# Patient Record
Sex: MALE | Race: WHITE | Employment: FULL TIME | URBAN - METROPOLITAN AREA
[De-identification: names, ages, dates, MRNs, and addresses within clinical notes are randomized per-mention and may not be internally consistent; named-entity substitution may affect disease eponyms.]

---

## 2022-06-18 ENCOUNTER — OFFICE VISIT (OUTPATIENT)
Dept: URGENT CARE | Facility: URGENT CARE | Age: 62
End: 2022-06-18
Payer: COMMERCIAL

## 2022-06-18 VITALS
SYSTOLIC BLOOD PRESSURE: 160 MMHG | OXYGEN SATURATION: 97 % | DIASTOLIC BLOOD PRESSURE: 80 MMHG | HEART RATE: 65 BPM | TEMPERATURE: 98.3 F | RESPIRATION RATE: 16 BRPM

## 2022-06-18 DIAGNOSIS — M54.2 NECK PAIN: ICD-10-CM

## 2022-06-18 DIAGNOSIS — S46.811A STRAIN OF TRAPEZIUS MUSCLE, RIGHT, INITIAL ENCOUNTER: ICD-10-CM

## 2022-06-18 DIAGNOSIS — R03.0 ELEVATED BLOOD PRESSURE READING WITHOUT DIAGNOSIS OF HYPERTENSION: Primary | ICD-10-CM

## 2022-06-18 PROCEDURE — 99204 OFFICE O/P NEW MOD 45 MIN: CPT | Performed by: FAMILY MEDICINE

## 2022-06-18 RX ORDER — CYCLOBENZAPRINE HCL 10 MG
10 TABLET ORAL 3 TIMES DAILY PRN
Qty: 45 TABLET | Refills: 0 | Status: SHIPPED | OUTPATIENT
Start: 2022-06-18

## 2022-06-18 RX ORDER — ALBUTEROL SULFATE 90 UG/1
2 AEROSOL, METERED RESPIRATORY (INHALATION) EVERY 6 HOURS
COMMUNITY

## 2022-06-18 RX ORDER — GABAPENTIN 100 MG/1
100 CAPSULE ORAL 3 TIMES DAILY
Qty: 30 CAPSULE | Refills: 0 | Status: SHIPPED | OUTPATIENT
Start: 2022-06-18

## 2022-06-18 NOTE — PROGRESS NOTES
Chief complaint: back pain    Right upper back  6 days ago patient lifted a lawnmower to the truck - it was an awkward position   Didn't have any immediate pain - maybe a bit sore that evening   That night reached for his phone and may have stretched   Started about 5 days ago the next day was when he started really having pain   Pain in the right upper back   Shooting down the right arm     BP elevated today but asymptomatic. No headache no blurring of vision no chest pain no shortness of breath no dizziness no unsteadiness no numbness no weakness  Tried over the counter medications: YES  History of trauma: none   Worse with movement relieved by rest  Worse with certain positions.  No fevers or chills chest pain or shortness of breath   No orthopnea pnd or edema     Problem list, Medication list, Allergies, and Medical/Social/Surgical histories reviewed in [facility] and updated as appropriate.    Had a full physical 2 months ago was told labs were good       REVIEW OF SYSTEMS  General: negative for fever, constitutional symptoms or weight loss  Resp: negative for chest pain or shortness of breath  CV: negative for chest pain  : negative for dysuria , incontinence, frequency  Musculoskeletal: as above  Neurologic: negative for ataxia, saddle anesthesia, fecal or urinary incontinence, one sided weakness,  Paresthesias  Constitutional, HEENT, cardiovascular, pulmonary, gi and gu systems are negative, except as otherwise noted.    Physical Exam:  Vitals: BP (!) 160/80   Pulse 65   Temp 98.3  F (36.8  C) (Tympanic)   Resp 16   SpO2 97%   BMI= There is no height or weight on file to calculate BMI.  Constitutional: healthy, alert and no acute distress   Head: atraumatic  Eyes: anicteric  CARDIO: regular in rate and rhythm no murmurs rubs or gallops  RESP: lungs clear to auscultation  Musculoskeletal: Nocervical spine tenderness   YES trapezius and levator muscle spasm on the right    Increased pain  In range of motion of  the neck  No neurologic deficits. No sensory deficits or motor deficits both upper and lower extremity   Negative Lhermitte's phenomenon  GAIT: intact  Psychiatric: mentation appears normal and affect normal/bright      Impression:     ICD-10-CM    1. Elevated blood pressure reading without diagnosis of hypertension  R03.0    2. Neck pain  M54.2    3. Strain of trapezius muscle, right, initial encounter  S46.811A cyclobenzaprine (FLEXERIL) 10 MG tablet     gabapentin (NEURONTIN) 100 MG capsule         Plan:  Prescribed with flexeril and gabapentin  Warned they are both sedating. Try not to take together  Sedating medications given. Aware not to drive or operate machinery while on these medications. Caution with .     Instructions for neck care and return precautions discussed.    neck pain stretching excercises discussed. supportive treatment.  considery physical therapy if not better despite supportive treatment.  activity modifications advised.  Over the counter medications discussed. Patient aware to avoid NSAIDS if with any kidney disease or ulcers. Proper dosing of over the counter medications likewise discussed.  Adverse reaction to medication discussed.  aware to come in right away if with any fever or chills, worsening symptoms, headache, bowel or bladder incontinence, motor or sensory deficits or gait disturbances.   follow-up recommended.    Your blood pressure reading was elevated today.  Recommend that you have it re-checked either at home or at our clinic within a week  Avoid cold medicines that have pseudoephedrin in it, or Sudafed. You may take regular or plain Robitussin or Mucinex  If you are unsure, please ask the pharmacist    If your blood pressure top number (systolic) 180 and above, or the bottom number (diastolic) 120 and above, you need to be seen immediately.  If persistently elevated top number 140 and above, or the bottom number 90 and above, please schedule an appointment to see  a provider in clinic within a week.  If you have very elevated blood pressures, accompanied by headache, chest pain, numbness, weakness, slurring of speech, confusion, difficulty walking, call 911 and go to the ER    We discussed possibility of aortic dissection - moderate to severe back pain, patient with elevated bp  Discussed ER patient declined.   History and exam does support more likely musculoskeletal - patient feels comfortable observing.  Has follow up with orthopedics in 2 days, advised to keep this appointment  Alarm signs or symptoms discussed, if present recommend go to ER       Hannah Gallo MD